# Patient Record
Sex: MALE | Race: WHITE | NOT HISPANIC OR LATINO | URBAN - METROPOLITAN AREA
[De-identification: names, ages, dates, MRNs, and addresses within clinical notes are randomized per-mention and may not be internally consistent; named-entity substitution may affect disease eponyms.]

---

## 2017-11-05 ENCOUNTER — SURGERY - HEALTHEAST (OUTPATIENT)
Dept: CARDIOLOGY | Facility: CLINIC | Age: 57
End: 2017-11-05

## 2021-05-25 ENCOUNTER — RECORDS - HEALTHEAST (OUTPATIENT)
Dept: ADMINISTRATIVE | Facility: CLINIC | Age: 61
End: 2021-05-25

## 2021-05-31 VITALS — WEIGHT: 158.8 LBS | BODY MASS INDEX: 22.73 KG/M2 | HEIGHT: 70 IN

## 2021-06-16 PROBLEM — M54.50 LUMBAR PAIN WITH RADIATION DOWN LEFT LEG: Status: ACTIVE | Noted: 2017-09-18

## 2021-06-16 PROBLEM — I21.3 STEMI (ST ELEVATION MYOCARDIAL INFARCTION) (H): Status: ACTIVE | Noted: 2017-11-05

## 2021-06-16 PROBLEM — M79.605 LUMBAR PAIN WITH RADIATION DOWN LEFT LEG: Status: ACTIVE | Noted: 2017-09-18

## 2021-12-11 ENCOUNTER — HEALTH MAINTENANCE LETTER (OUTPATIENT)
Age: 61
End: 2021-12-11

## 2022-10-01 ENCOUNTER — HEALTH MAINTENANCE LETTER (OUTPATIENT)
Age: 62
End: 2022-10-01

## 2023-02-05 ENCOUNTER — HEALTH MAINTENANCE LETTER (OUTPATIENT)
Age: 63
End: 2023-02-05

## 2023-10-15 ENCOUNTER — HEALTH MAINTENANCE LETTER (OUTPATIENT)
Age: 63
End: 2023-10-15

## 2024-04-29 ENCOUNTER — TELEPHONE (OUTPATIENT)
Dept: OPHTHALMOLOGY | Facility: CLINIC | Age: 64
End: 2024-04-29
Payer: COMMERCIAL

## 2024-04-29 NOTE — TELEPHONE ENCOUNTER
Hello,     I am not seeing notes     Pt is requesting a sooner appointment     Ok to schedule in neuro for next available or general ok     Thanks,     v

## 2024-04-29 NOTE — TELEPHONE ENCOUNTER
[facility] Call Center    Phone Message    May a detailed message be left on voicemail: yes     Reason for Call: Appointment Intake    Referring Provider Name: Destinee Doherty OD  Diagnosis and/or Symptoms: Right Optic nerve edema. Requesting stat appointment.  Referral on the way.      Action Taken: Message routed to:  [facility] ([facility]): Ophthalmology    Travel Screening: Not Applicable

## 2024-04-30 ENCOUNTER — TRANSCRIBE ORDERS (OUTPATIENT)
Dept: OTHER | Age: 64
End: 2024-04-30

## 2024-04-30 DIAGNOSIS — H53.40 VISUAL FIELD DEFECT: ICD-10-CM

## 2024-04-30 DIAGNOSIS — H47.10 EDEMA OF OPTIC DISC OF RIGHT EYE: Primary | ICD-10-CM

## 2024-05-22 ENCOUNTER — LAB (OUTPATIENT)
Dept: LAB | Facility: CLINIC | Age: 64
End: 2024-05-22
Attending: STUDENT IN AN ORGANIZED HEALTH CARE EDUCATION/TRAINING PROGRAM
Payer: COMMERCIAL

## 2024-05-22 ENCOUNTER — OFFICE VISIT (OUTPATIENT)
Dept: OPHTHALMOLOGY | Facility: CLINIC | Age: 64
End: 2024-05-22
Attending: STUDENT IN AN ORGANIZED HEALTH CARE EDUCATION/TRAINING PROGRAM
Payer: COMMERCIAL

## 2024-05-22 DIAGNOSIS — H47.10 EDEMA OF OPTIC DISC OF RIGHT EYE: ICD-10-CM

## 2024-05-22 DIAGNOSIS — H52.4 HYPEROPIA WITH PRESBYOPIA OF BOTH EYES: Primary | ICD-10-CM

## 2024-05-22 DIAGNOSIS — H46.9 OPTIC NEUROPATHY, RIGHT: ICD-10-CM

## 2024-05-22 DIAGNOSIS — H53.40 VISUAL FIELD DEFECT: ICD-10-CM

## 2024-05-22 DIAGNOSIS — H52.03 HYPEROPIA WITH PRESBYOPIA OF BOTH EYES: Primary | ICD-10-CM

## 2024-05-22 LAB — T PALLIDUM AB SER QL: NONREACTIVE

## 2024-05-22 PROCEDURE — 92015 DETERMINE REFRACTIVE STATE: CPT

## 2024-05-22 PROCEDURE — 92133 CPTRZD OPH DX IMG PST SGM ON: CPT | Performed by: STUDENT IN AN ORGANIZED HEALTH CARE EDUCATION/TRAINING PROGRAM

## 2024-05-22 PROCEDURE — 86481 TB AG RESPONSE T-CELL SUSP: CPT | Performed by: STUDENT IN AN ORGANIZED HEALTH CARE EDUCATION/TRAINING PROGRAM

## 2024-05-22 PROCEDURE — 86363 MOG-IGG1 ANTB FLO CYTMTRY EA: CPT | Mod: 90 | Performed by: PATHOLOGY

## 2024-05-22 PROCEDURE — 86036 ANCA SCREEN EACH ANTIBODY: CPT | Performed by: STUDENT IN AN ORGANIZED HEALTH CARE EDUCATION/TRAINING PROGRAM

## 2024-05-22 PROCEDURE — 85549 MURAMIDASE: CPT | Mod: 90 | Performed by: PATHOLOGY

## 2024-05-22 PROCEDURE — 82164 ANGIOTENSIN I ENZYME TEST: CPT | Mod: 90 | Performed by: PATHOLOGY

## 2024-05-22 PROCEDURE — 99204 OFFICE O/P NEW MOD 45 MIN: CPT | Performed by: STUDENT IN AN ORGANIZED HEALTH CARE EDUCATION/TRAINING PROGRAM

## 2024-05-22 PROCEDURE — 86780 TREPONEMA PALLIDUM: CPT | Performed by: STUDENT IN AN ORGANIZED HEALTH CARE EDUCATION/TRAINING PROGRAM

## 2024-05-22 PROCEDURE — 86053 AQAPRN-4 ANTB FLO CYTMTRY EA: CPT | Mod: 90 | Performed by: PATHOLOGY

## 2024-05-22 PROCEDURE — 86038 ANTINUCLEAR ANTIBODIES: CPT | Performed by: STUDENT IN AN ORGANIZED HEALTH CARE EDUCATION/TRAINING PROGRAM

## 2024-05-22 PROCEDURE — 92083 EXTENDED VISUAL FIELD XM: CPT | Performed by: STUDENT IN AN ORGANIZED HEALTH CARE EDUCATION/TRAINING PROGRAM

## 2024-05-22 PROCEDURE — 99000 SPECIMEN HANDLING OFFICE-LAB: CPT | Performed by: PATHOLOGY

## 2024-05-22 PROCEDURE — 99213 OFFICE O/P EST LOW 20 MIN: CPT | Performed by: STUDENT IN AN ORGANIZED HEALTH CARE EDUCATION/TRAINING PROGRAM

## 2024-05-22 PROCEDURE — 36415 COLL VENOUS BLD VENIPUNCTURE: CPT | Performed by: PATHOLOGY

## 2024-05-22 ASSESSMENT — CUP TO DISC RATIO
OD_RATIO: 0.2
OS_RATIO: 0.3

## 2024-05-22 ASSESSMENT — CONF VISUAL FIELD
OS_NORMAL: 1
OS_INFERIOR_TEMPORAL_RESTRICTION: 0
OS_SUPERIOR_TEMPORAL_RESTRICTION: 0
METHOD: COUNTING FINGERS
OS_INFERIOR_NASAL_RESTRICTION: 0
OD_INFERIOR_NASAL_RESTRICTION: 1
OD_INFERIOR_TEMPORAL_RESTRICTION: 1
OS_SUPERIOR_NASAL_RESTRICTION: 0

## 2024-05-22 ASSESSMENT — TONOMETRY
OD_IOP_MMHG: 19
OS_IOP_MMHG: 17
IOP_METHOD: ICARE

## 2024-05-22 ASSESSMENT — EXTERNAL EXAM - LEFT EYE: OS_EXAM: WNL

## 2024-05-22 ASSESSMENT — VISUAL ACUITY
OD_SC: 20/25
OS_SC: 20/25
METHOD: SNELLEN - LINEAR
OS_SC+: -3

## 2024-05-22 ASSESSMENT — REFRACTION_MANIFEST
OD_ADD: +2.25
OD_SPHERE: +0.50
OS_ADD: +2.25
OS_SPHERE: +1.00

## 2024-05-22 ASSESSMENT — SLIT LAMP EXAM - LIDS
COMMENTS: WNL
COMMENTS: WNL

## 2024-05-22 ASSESSMENT — EXTERNAL EXAM - RIGHT EYE: OD_EXAM: WNL

## 2024-05-22 NOTE — NURSING NOTE
Chief Complaints and History of Present Illnesses   Patient presents with    New Patient     New patient with VF defect.     Chief Complaint(s) and History of Present Illness(es)       New Patient              Laterality: right eye    Onset: 6 weeks ago    Location: peripheral vision    Associated symptoms: Negative for double vision, headache, flashes and floaters    Treatments tried: no treatments    Pain scale: 0/10    Comments: New patient with VF defect.              Comments    Patient notes a VF defect in his Right eye.  Six weeks he suddenly notices a change in hir Right eye peripheral vision.  Virgen Ellis, COA, COA 8:24 AM 05/22/2024

## 2024-05-22 NOTE — PROGRESS NOTES
HPI       New Patient    In right eye.  This started 6 weeks ago.  Presenting in peripheral vision.  Associated symptoms include Negative for double vision, headache, flashes and floaters.  Treatments tried include no treatments.  Pain was noted as 0/10. Additional comments: New patient with VF defect.             Comments    Patient notes a VF defect in his Right eye.  Six weeks he suddenly notices a change in hir Right eye peripheral vision.  ROBERT Cox, ROBERT 8:24 AM 05/22/2024              Last edited by Virgen Ellis COA on 5/22/2024  8:24 AM.          Review of systems for the eyes was negative other than the pertinent positives/negatives listed in the HPI.    Ocular Meds: none    Ocular Hx: refractive error OU; s/p bilateral TAB; optic nerve head edema OD    FOHx: no pertinent family ocular history    PMHx:   Patient Active Problem List   Diagnosis    Hypertension    Lumbar pain with radiation down left leg    Squamous cell skin cancer    Hyperlipidemia    STEMI (ST elevation myocardial infarction) (H)    Unstable angina pectoris (H)    Tobacco user    Essential hypertension    PVD (peripheral vascular disease) (H24)     Imaging:  MRI Head. MRA Head and Neck 4/27/24:    HEAD MRI:   1.  No recent infarct, mass or evidence of intracranial hemorrhage.   2.  Mild number of nonspecific foci of nonenhancing T2 signal hyperintensity within the supratentorial white matter, more prominent in the left hemisphere than the right and with mild interval progression. Appearance is nonspecific with differential considerations including foci of nonspecific gliosis or chronic myelin loss, sequelae of chronic headaches and mild chronic small vessel ischemic changes.     HEAD MRA:   1.  Findings consistent with right moyamoya. Flow-related signal in the distal right middle cerebral artery territory is significantly decreased. Of note, on the comparison brain MRI from 05/22/2014, there were small tortuous flow  "voids surrounding the proximal right middle cerebral artery as well as diminished flow voids in the distal right MCA territory suggesting this has been present at least since that time, although comparison with the degree of vascular compromise is not possible. Neurological consultation is recommended.   2.  2 mm aneurysm arising in the region of the anterior communicating artery.   3.  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       NECK MRA:   1.  Normal neck MRA.     5/8/24 Biopsy  A) TEMPORAL ARTERY, LEFT, BIOPSY:   1. Histologically normal artery   2. Negative for arteritis     B) TEMPORAL ARTERY, RIGHT, BIOPSY:   1. Histologically normal artery   2. Negative for arteritis     A) Received in formalin, labeled with the patient's name and \"left temporal artery biopsy,\" is a 0.5 x 0.3 x 0.1 cm tan-pink soft tissue. There is no clear lumen on the cut surface. The specimen is submitted entirely in 1 cassette (ink down)    B) Received in formalin, labeled with the patient's name and \"right temporal artery biopsy,\" is a 0.4 x 0.4 x 0.3 cm tan-pink soft tissue. There is no clear lumen on the cut surface. The specimen is submitted entirely in 1 cassette (inked down)    Assessment & Plan     Guillermo Méndez is a 63 year old male with the following diagnoses:    Optic Neuropathy, right eye   Visual field defect, right eye  - woke up approximately six weeks prior with painless vision light of inferior visual field, saw optometrist who noted disk edema right eye  - was seen in hospital recently with extensive work up after seeing eye doctor  - GCA ROS was negative, however, patient was on PO prednisone 60 mg x 1 week, no longer taking  - neuroimaging as above; also s/p bilateral TAB with results as above  - labs 4/26/24: CBC - anemia noted on labs; ESR - 26 mm/hr' CRP - 0/9 mg/dL (mildly elevated); Lyme screen with reflex - negative; 4/20/24 B12 494 (normal), Folic acid 11,2 (normal)  - patient notes having had " anemia with Hb ~6 which is being worked up  - while nonarteritic ischemic optic neuropathy of right eye is highest on differential with disk at risk, other factors to consider, patient with recent anemia and patient also with significant vascular disease  - will complete work up with ACE, MEL, ANCA, Lysozyme, CNS demyelinating disease serum, Quantiferon Gold, and FTA/RPR  - advised sleep study  - reviewed with patient fellow eye may be at increased risk, though c/d ratio 0.3, but to present to see eye care provider with any changes  - refer to neuro-ophthalmology 4 weeks for recheck, sooner changes    Dot blot hemorrhage right eye   - single hemorrhage right eye, broad differential, may also be due to anemia, HTN, Diabetes  - will monitor; letter to PCP for further work up as needed    Hyperopia of both eyes with astigmatism and presbyopia  - excellent BCVA    Counseled return/RD precautions    Patient disposition:   Return for Follow Up 4 weeks neuro oph, 4 months general,  or sooner changes.    Attending Physician Attestation:  Complete documentation of historical and exam elements from today's encounter can be found in the full encounter summary report (not reduplicated in this progress note).  I personally obtained the chief complaint(s) and history of present illness.  I confirmed and edited as necessary the review of systems, past medical/surgical history, family history, social history, and examination findings as documented by others; and I examined the patient myself.  I personally reviewed the relevant tests, images, and reports as documented above.  I formulated and edited as necessary the assessment and plan and discussed the findings and management plan with the patient and family. I spent a total of 45 minutes face to face with the patient.  Over 50% of this time was spent counseling and coordinating care regarding their diagnosis and management. - Gardenia Garay MD

## 2024-05-23 LAB
ANA PAT SER IF-IMP: ABNORMAL
ANA SER QL IF: ABNORMAL
ANA TITR SER IF: ABNORMAL {TITER}
ANCA AB PATTERN SER IF-IMP: NORMAL
C-ANCA TITR SER IF: NORMAL {TITER}
GAMMA INTERFERON BACKGROUND BLD IA-ACNC: 0.03 IU/ML
M TB IFN-G BLD-IMP: NEGATIVE
M TB IFN-G CD4+ BCKGRND COR BLD-ACNC: 9.97 IU/ML
MITOGEN IGNF BCKGRD COR BLD-ACNC: -0.01 IU/ML
MITOGEN IGNF BCKGRD COR BLD-ACNC: 0 IU/ML
QUANTIFERON MITOGEN: 10 IU/ML
QUANTIFERON NIL TUBE: 0.03 IU/ML
QUANTIFERON TB1 TUBE: 0.03 IU/ML
QUANTIFERON TB2 TUBE: 0.02

## 2024-05-24 LAB — ACE SERPL-CCNC: <10 U/L

## 2024-05-26 LAB — LYSOZYME SERPL-MCNC: 1.18 UG/ML

## 2024-05-28 LAB
AQP4 H2O CHANNEL IGG SERPL QL: NEGATIVE
CNS DEMYELINATING DISEASE INTERPRETATION, SERUM: NORMAL
MOG FACS, S: NEGATIVE

## 2024-06-24 DIAGNOSIS — H53.40 VISUAL FIELD DEFECT: Primary | ICD-10-CM

## 2024-12-08 ENCOUNTER — HEALTH MAINTENANCE LETTER (OUTPATIENT)
Age: 64
End: 2024-12-08